# Patient Record
Sex: MALE | Race: WHITE | Employment: FULL TIME | URBAN - METROPOLITAN AREA
[De-identification: names, ages, dates, MRNs, and addresses within clinical notes are randomized per-mention and may not be internally consistent; named-entity substitution may affect disease eponyms.]

---

## 2022-11-07 ENCOUNTER — APPOINTMENT (OUTPATIENT)
Dept: GENERAL RADIOLOGY | Age: 37
End: 2022-11-07
Attending: EMERGENCY MEDICINE

## 2022-11-07 ENCOUNTER — HOSPITAL ENCOUNTER (EMERGENCY)
Age: 37
Discharge: HOME | End: 2022-11-07
Attending: EMERGENCY MEDICINE

## 2022-11-07 VITALS
HEART RATE: 97 BPM | TEMPERATURE: 97.7 F | OXYGEN SATURATION: 97 % | SYSTOLIC BLOOD PRESSURE: 146 MMHG | HEIGHT: 73 IN | WEIGHT: 280 LBS | BODY MASS INDEX: 37.11 KG/M2 | DIASTOLIC BLOOD PRESSURE: 86 MMHG | RESPIRATION RATE: 17 BRPM

## 2022-11-07 DIAGNOSIS — S20.212A RIB CONTUSION, LEFT, INITIAL ENCOUNTER: Primary | ICD-10-CM

## 2022-11-07 PROCEDURE — 71101 X-RAY EXAM UNILAT RIBS/CHEST: CPT

## 2022-11-07 PROCEDURE — 99283 EMERGENCY DEPT VISIT LOW MDM: CPT

## 2022-11-07 RX ORDER — IBUPROFEN 800 MG/1
800 TABLET ORAL
Qty: 30 TABLET | Refills: 0 | Status: SHIPPED | OUTPATIENT
Start: 2022-11-07 | End: 2022-11-17

## 2022-11-07 NOTE — ED PROVIDER NOTES
EMERGENCY DEPARTMENT HISTORY AND PHYSICAL EXAM    Date: 11/7/2022  Patient Name: Indra Chaves    History of Presenting Illness     Chief Complaint   Patient presents with    Fall       History Provided By: Patient     History Jaky Matos):   9:07 AM  Indra Chaves is a 29 y.o. male with no contributory PMHX who presents to the emergency department (room 15) C/O nonsyncopal fall onset this morning. Associated sxs include left-sided rib pain. Pt denies pre-existing chest pain, shortness of breath, lightheadedness or any other sxs or complaints. Patient states that he was stepping over a laundry basket got his foot caught in the laundry basket causing him to fall onto the basket hurting his left side. He now has left rib tenderness. He denies pre-existing symptoms. Chief Complaint: Nonsyncopal fall  Onset: This morning  Timing:  Acute  Context:  Tripping over a laundry basket brought symptoms on, symptoms have rapidly worsened since onset  Location: Left side  Quality: Sharp  Severity: Mild  Modifying Factors: Nothing makes it better, or worse. Associated Symptoms:  Left-sided rib pain    PCP: None     Past History         Past Medical History:  History reviewed. No pertinent past medical history. Past Surgical History:  Past Surgical History:   Procedure Laterality Date    HX CHOLECYSTECTOMY      HX HEENT      tonsillectomy    HX HEENT      wisdom teeth removed    HX ORTHOPAEDIC      bilateral achilles tendon repair    AK ABDOMEN SURGERY PROC UNLISTED      bilateral hernia       Family History:  History reviewed. No pertinent family history.   Reviewed and non-contributory    Social History:  Social History     Tobacco Use    Smoking status: Every Day     Packs/day: 1.00     Types: Cigarettes    Smokeless tobacco: Never   Substance Use Topics    Alcohol use: Yes     Comment: case of beer daily    Drug use: Yes     Types: Marijuana       Medications:  Current Outpatient Medications   Medication Sig Dispense Refill    ibuprofen (MOTRIN) 800 mg tablet Take 1 Tablet by mouth every eight (8) hours as needed for Pain for up to 10 days. 30 Tablet 0    predniSONE (STERAPRED DS) 10 mg dose pack 4 tablets x 3 days, 3 tablets x 3 days, 2 tablets x 3 days, 1 tablet x 3 days 30 Tab 0    baclofen (LIORESAL) 20 mg tablet Take 1 Tab by mouth three (3) times daily. 20 Tab 0    HYDROcodone-acetaminophen (NORCO) 5-325 mg per tablet Take 1 Tab by mouth every eight (8) hours as needed for Pain. Max Daily Amount: 3 Tabs. 6 Tab 0       Allergies: Allergies   Allergen Reactions    Sulfacetamide Unknown (comments)     I swell up like a giant       Social Determinants of Health:  Social Determinants of Health     Tobacco Use: High Risk    Smoking Tobacco Use: Every Day    Smokeless Tobacco Use: Never    Passive Exposure: Not on file   Alcohol Use: Not on file   Financial Resource Strain: Not on file   Food Insecurity: Not on file   Transportation Needs: Not on file   Physical Activity: Not on file   Stress: Not on file   Social Connections: Not on file   Intimate Partner Violence: Not on file   Depression: Not on file   Housing Stability: Not on file       Review of Systems      Review of Systems   Constitutional:  Negative for chills and fever. HENT:  Negative for rhinorrhea and sore throat. Eyes:  Negative for pain and visual disturbance. Respiratory:  Negative for chest tightness, shortness of breath and wheezing. Cardiovascular:  Positive for chest pain. Negative for palpitations. Gastrointestinal:  Negative for abdominal pain, diarrhea, nausea and vomiting. Musculoskeletal:  Negative for arthralgias and myalgias. Skin:  Negative for rash and wound. Neurological:  Negative for speech difficulty, light-headedness and headaches. Psychiatric/Behavioral:  Negative for agitation and confusion. All other systems reviewed and are negative.     Physical Exam     Vitals:    11/07/22 0842   BP: (!) 146/86   Pulse: 97 Resp: 17   Temp: 97.7 °F (36.5 °C)   SpO2: 97%   Weight: 127 kg (280 lb)   Height: 6' 1\" (1.854 m)       Physical Exam  Vitals and nursing note reviewed. Constitutional:       General: He is not in acute distress. Appearance: Normal appearance. He is normal weight. He is not ill-appearing. HENT:      Head: Normocephalic and atraumatic. Nose: Nose normal. No rhinorrhea. Mouth/Throat:      Mouth: Mucous membranes are moist.      Pharynx: No oropharyngeal exudate or posterior oropharyngeal erythema. Eyes:      Extraocular Movements: Extraocular movements intact. Conjunctiva/sclera: Conjunctivae normal.      Pupils: Pupils are equal, round, and reactive to light. Cardiovascular:      Rate and Rhythm: Normal rate and regular rhythm. Heart sounds: No murmur heard. No friction rub. No gallop. Pulmonary:      Effort: Pulmonary effort is normal. No respiratory distress. Breath sounds: Normal breath sounds. No wheezing, rhonchi or rales. Chest:      Chest wall: Tenderness present. No mass, lacerations, deformity, swelling, crepitus or edema. There is no dullness to percussion. Abdominal:      General: Bowel sounds are normal.      Palpations: Abdomen is soft. Tenderness: There is no abdominal tenderness. There is no guarding or rebound. Musculoskeletal:         General: No swelling, tenderness or deformity. Normal range of motion. Cervical back: Normal range of motion and neck supple. No rigidity. Lymphadenopathy:      Cervical: No cervical adenopathy. Skin:     General: Skin is warm and dry. Findings: No rash. Neurological:      General: No focal deficit present. Mental Status: He is alert and oriented to person, place, and time. Psychiatric:         Mood and Affect: Mood normal.         Behavior: Behavior normal.       Diagnostic Study Results     Labs -  No results found for this or any previous visit (from the past 12 hour(s)).     Radiologic Studies -   XR RIBS LT W PA CXR MIN 3 V   Final Result   No acute abnormality identified. CT Results  (Last 48 hours)      None          CXR Results  (Last 48 hours)      None            Medications given in the ED-  Medications - No data to display    Procedures     Procedures    ED Course     I Mohsen Dennis MD) am the first provider for this patient. I reviewed the vital signs, available nursing notes, past medical history, past surgical history, family history and social history. Records Reviewed: Nursing Notes    Cardiac Monitor:  Rate: 97 bpm  Rhythm: sinus rhythm    Pulse Oximetry Analysis - 97% on RA    9:07 AM Initial assessment performed. The patients presenting problems have been discussed, and they are in agreement with the care plan formulated and outlined with them. I have encouraged them to ask questions as they arise throughout their visit. Medical Decision Making     Provider Notes (Medical Decision Making):   DDX: Sprain, strain, fracture, contusion    Discussion:  29 y.o. male with acute nonsyncopal fall which is purely mechanical in nature onto his left side on a laundry basket. Patient has no signs of rib fracture. We will treat with ibuprofen. Patient may follow-up with his primary care doctor or 1 of ours. Patient understands agrees with this plan. Work note provided at patient request.    Diagnosis and Disposition     DISCHARGE NOTE:  9:57 AM   Char Gist  results have been reviewed with him. He has been counseled regarding his diagnosis, treatment, and plan. He verbally conveys understanding and agreement of the signs, symptoms, diagnosis, treatment and prognosis and additionally agrees to follow up as discussed. He also agrees with the care-plan and conveys that all of his questions have been answered.   I have also provided discharge instructions for him that include: educational information regarding their diagnosis and treatment, and list of reasons why they would want to return to the ED prior to their follow-up appointment, should his condition change. He has been provided with education for proper emergency department utilization. CLINICAL IMPRESSION:    1. Rib contusion, left, initial encounter        PLAN:  1. D/C Home  2. Current Discharge Medication List        START taking these medications    Details   ibuprofen (MOTRIN) 800 mg tablet Take 1 Tablet by mouth every eight (8) hours as needed for Pain for up to 10 days. Qty: 30 Tablet, Refills: 0  Start date: 11/7/2022, End date: 11/17/2022           3. Follow-up Information       Follow up With Specialties Details Why Contact Info    [place] [address] [facility]  Schedule an appointment as soon as possible for a visit  As soon as possible, For follow up from Emergency Department visit. [facility] [address]  [address] [facility]  Schedule an appointment as soon as possible for a visit  As soon as possible, For follow up from Emergency Department visit. [address]  Schedule an appointment as soon as possible for a visit  As soon as possible, For follow up from Emergency Department visit. [facility] [address]  [phone]    [facility] EMERGENCY DEPT Emergency Medicine  As needed; If symptoms worsen [address] [place] [id number] [facility] Jacqueline Paul MD am the primary clinician of record. Monyon Disclaimer     Please note that this dictation was completed with [employer], the computer voice recognition software. Quite often unanticipated grammatical, syntax, homophones, and other interpretive errors are inadvertently transcribed by the computer software. Please disregard these errors. Please excuse any errors that have escaped final proofreading.     Cornelius Paul MD

## 2022-11-07 NOTE — ED TRIAGE NOTES
Ambulatory patient arrives with complaints of falling at 5am onto a laundry basket and falling on left side, left sided rib pain now

## 2022-11-07 NOTE — Clinical Note
[facility] [place]  [facility]  [address]  [facility] [address] [phone]    Work/School Note    Date: 11/7/2022    To Whom It May concern: Thu Infante was seen and treated today in the emergency room by the following provider(s):  Attending Provider: Aris Garcia MD.      Thu Infante is excused from work/school on 11/07/22. He is clear to return to work/school on 11/08/22.         Sincerely,          Jay Sparks MD

## 2022-11-07 NOTE — ED NOTES
Presents to the ED with left rib pain, states he got up this am and tripped on a basket and landed right on the basket with left side. Now having rib pain.

## 2022-11-07 NOTE — Clinical Note
[facility] [place]  [facility] EMERGENCY DEPT  [address] [facility] [address] [phone]    Work/School Note    Date: 11/7/2022    To Whom It May concern: Severo Calamity was seen and treated today in the emergency room by the following provider(s):  Attending Provider: Suszanne Habermann, MD.      Severo Calamity is excused from work/school on 11/07/22. He is clear to return to work/school on 11/08/22.         Sincerely,          Felicia Reyes MD